# Patient Record
Sex: MALE | Race: BLACK OR AFRICAN AMERICAN | NOT HISPANIC OR LATINO
[De-identification: names, ages, dates, MRNs, and addresses within clinical notes are randomized per-mention and may not be internally consistent; named-entity substitution may affect disease eponyms.]

---

## 2024-08-30 ENCOUNTER — APPOINTMENT (OUTPATIENT)
Dept: OTOLARYNGOLOGY | Facility: CLINIC | Age: 69
End: 2024-08-30
Payer: COMMERCIAL

## 2024-08-30 ENCOUNTER — NON-APPOINTMENT (OUTPATIENT)
Age: 69
End: 2024-08-30

## 2024-08-30 VITALS — WEIGHT: 210 LBS | BODY MASS INDEX: 31.1 KG/M2 | HEIGHT: 69 IN

## 2024-08-30 DIAGNOSIS — Z83.3 FAMILY HISTORY OF DIABETES MELLITUS: ICD-10-CM

## 2024-08-30 DIAGNOSIS — Z86.79 PERSONAL HISTORY OF OTHER DISEASES OF THE CIRCULATORY SYSTEM: ICD-10-CM

## 2024-08-30 DIAGNOSIS — Z82.49 FAMILY HISTORY OF ISCHEMIC HEART DISEASE AND OTHER DISEASES OF THE CIRCULATORY SYSTEM: ICD-10-CM

## 2024-08-30 DIAGNOSIS — Z87.891 PERSONAL HISTORY OF NICOTINE DEPENDENCE: ICD-10-CM

## 2024-08-30 DIAGNOSIS — Z86.39 PERSONAL HISTORY OF OTHER ENDOCRINE, NUTRITIONAL AND METABOLIC DISEASE: ICD-10-CM

## 2024-08-30 DIAGNOSIS — J34.0 ABSCESS, FURUNCLE AND CARBUNCLE OF NOSE: ICD-10-CM

## 2024-08-30 DIAGNOSIS — Z78.9 OTHER SPECIFIED HEALTH STATUS: ICD-10-CM

## 2024-08-30 DIAGNOSIS — Z83.511 FAMILY HISTORY OF GLAUCOMA: ICD-10-CM

## 2024-08-30 PROBLEM — Z00.00 ENCOUNTER FOR PREVENTIVE HEALTH EXAMINATION: Status: ACTIVE | Noted: 2024-08-30

## 2024-08-30 PROCEDURE — 30020 DRAINAGE OF NOSE LESION: CPT

## 2024-08-30 PROCEDURE — 99204 OFFICE O/P NEW MOD 45 MIN: CPT | Mod: 25

## 2024-08-30 RX ORDER — MUPIROCIN 20 MG/G
2 OINTMENT TOPICAL 3 TIMES DAILY
Qty: 1 | Refills: 1 | Status: ACTIVE | COMMUNITY
Start: 2024-08-30 | End: 1900-01-01

## 2024-08-30 RX ORDER — ATORVASTATIN CALCIUM 80 MG/1
TABLET, FILM COATED ORAL
Refills: 0 | Status: ACTIVE | COMMUNITY

## 2024-08-30 RX ORDER — BENAZEPRIL HYDROCHLORIDE 5 MG/1
TABLET ORAL
Refills: 0 | Status: ACTIVE | COMMUNITY

## 2024-08-30 RX ORDER — AMLODIPINE BESYLATE 5 MG/1
TABLET ORAL
Refills: 0 | Status: ACTIVE | COMMUNITY

## 2024-08-30 NOTE — HISTORY OF PRESENT ILLNESS
[de-identified] : Mr. Moustapha Lei is a pleasant 69 year male presenting today as referral from  for nasal abscess.    Pt reports that a few days ago he started noting nasal pain and swelling. This spread to his cheeks and under-eyes. It has caused him significant pain to which he cannot sleep. He had some Augmentin at home which he has been taking since yesterday evening with some improvement. However, he went to  today and they were concerned he had an abscess and he was sent here.   Past medical/surgical history includes HLD, HTN. Former smoker. Not on anticoagulation. Here today with his wife.

## 2024-08-30 NOTE — PHYSICAL EXAM
[TextEntry] : GEN: well nourished, well developed, no acute distress. VOICE: normal tone, quality, and volume, no hoarseness. HEAD: normocephalic, atraumatic, no TMJ pain or jaw clicking FACE: symmetric and motion intact, [place] 6  EYES: EOMI, pupils symmetric, normal conjunctiva, vision grossly intact EARS: bilateral auricles normal, TMs intact & well-aerated, hearing grossly intact EXT NOSE:  thick skin, severe induration and erythema with tenderness to palpation across the nasal tip  INT NOSE: Mucosa erythematous and friable, midline septum, fluctuant area noted at cephalic aspect of left medial lower lateral cartilage with surrounding induration  SKIN: intact, warm, and dry NEURO: alert & oriented x4

## 2024-08-30 NOTE — ASSESSMENT
[FreeTextEntry1] : .  Plan: - Drainage was performed as described above. Will follow up on cultures - Continue Augmentin x7 days - Mupirocin ointment BID - RTC 1 week  -- Susie Mendoza MD Facial Plastic & Reconstructive Surgery

## 2024-08-30 NOTE — PROCEDURE
[FreeTextEntry3] : Procedure: Incision & Draiange of Nose Consent: The procedure, risks, benefits and alternatives to the procedure was discussed with with patient and verbal consent was obtained. Procedure: The bed was gently reclined and the site was confirmed with the patient. The patient was prepped and draped. Topical anesthetic was first applied. The area was infiltrated with 2 mL of 1% lidocaine with 1:100,000 epinephrine. After allowing for adequate time for local anesthetic to take effect, a sharp 18g needle was used to aspirate in the area of maximal fluctuance at the cephalic border of the left lower lateral cartilage. Upon release of the skin and dermis there was noted to be a 2cc of purulent drainage which was expressed.  Disposition: The patient tolerated procedure without issue. The wound was dressed in the appropriate fashion.

## 2024-09-02 ENCOUNTER — TRANSCRIPTION ENCOUNTER (OUTPATIENT)
Age: 69
End: 2024-09-02

## 2024-09-05 LAB — BACTERIA WND CULT: ABNORMAL
